# Patient Record
Sex: FEMALE | Race: WHITE | NOT HISPANIC OR LATINO | Employment: UNEMPLOYED | URBAN - NONMETROPOLITAN AREA
[De-identification: names, ages, dates, MRNs, and addresses within clinical notes are randomized per-mention and may not be internally consistent; named-entity substitution may affect disease eponyms.]

---

## 2020-07-11 ENCOUNTER — HOSPITAL ENCOUNTER (EMERGENCY)
Facility: HOSPITAL | Age: 35
Discharge: HOME | End: 2020-07-11
Attending: EMERGENCY MEDICINE | Admitting: EMERGENCY MEDICINE
Payer: COMMERCIAL

## 2020-07-11 VITALS
TEMPERATURE: 97.9 F | SYSTOLIC BLOOD PRESSURE: 142 MMHG | OXYGEN SATURATION: 97 % | HEART RATE: 95 BPM | WEIGHT: 105 LBS | DIASTOLIC BLOOD PRESSURE: 90 MMHG | RESPIRATION RATE: 18 BRPM

## 2020-07-11 DIAGNOSIS — S61.411A LACERATION OF RIGHT HAND, INITIAL ENCOUNTER: Primary | ICD-10-CM

## 2020-07-11 PROCEDURE — 99282 EMERGENCY DEPT VISIT SF MDM: CPT

## 2020-07-11 PROCEDURE — 12001 RPR S/N/AX/GEN/TRNK 2.5CM/<: CPT | Performed by: EMERGENCY MEDICINE

## 2020-07-11 PROCEDURE — 99284 EMERGENCY DEPT VISIT MOD MDM: CPT | Performed by: EMERGENCY MEDICINE

## 2020-07-11 RX ORDER — BACITRACIN, NEOMYCIN, POLYMYXIN B 400; 3.5; 5 [USP'U]/G; MG/G; [USP'U]/G
1 OINTMENT TOPICAL ONCE
Status: COMPLETED | OUTPATIENT
Start: 2020-07-11 | End: 2020-07-11

## 2020-07-11 RX ADMIN — BACITRACIN, NEOMYCIN, POLYMYXIN B 1 SMALL APPLICATION: 400; 3.5; 5 OINTMENT TOPICAL at 03:31

## 2020-07-11 NOTE — ED PROVIDER NOTES
History  Chief Complaint   Patient presents with    Hand Laceration     Pt presents to ED via walk in with laceration on the right thumb  Bleeding controlled by bandage  Patient is a 53-year-old female presents the emergency department for laceration on the right hand sustained from a razor blade which she was using to cut a plastic box  Razor blade was new and clean tetanus is up-to-date no other injury  History provided by:  Patient  Head Laceration   Location:  Hand  Hand laceration location:  R hand  Length:  2  Depth: Through underlying tissue  Quality: straight    Bleeding: controlled    Time since incident:  1 hour  Laceration mechanism:  Knife  Foreign body present:  No foreign bodies  Tetanus status:  Up to date  Associated symptoms: no fever and no rash        None       Past Medical History:   Diagnosis Date    Foot deformity, bilateral        Past Surgical History:   Procedure Laterality Date    FOOT SURGERY      b/l       History reviewed  No pertinent family history  I have reviewed and agree with the history as documented  E-Cigarette/Vaping    E-Cigarette Use Never User      E-Cigarette/Vaping Substances     Social History     Tobacco Use    Smoking status: Current Every Day Smoker     Packs/day: 0 50    Smokeless tobacco: Never Used   Substance Use Topics    Alcohol use: Not Currently    Drug use: Yes     Frequency: 2 0 times per week     Types: Methamphetamines       Review of Systems   Constitutional: Negative for activity change, appetite change, chills, fatigue and fever  HENT: Negative for congestion, ear pain, rhinorrhea and sore throat  Eyes: Negative for discharge, redness and visual disturbance  Respiratory: Negative for cough, chest tightness, shortness of breath and wheezing  Cardiovascular: Negative for chest pain and palpitations  Gastrointestinal: Negative for abdominal pain, constipation, diarrhea, nausea and vomiting     Endocrine: Negative for polydipsia and polyuria  Genitourinary: Negative for difficulty urinating, dysuria, frequency, hematuria and urgency  Musculoskeletal: Negative for arthralgias and myalgias  Skin: Positive for wound  Negative for color change, pallor and rash  Neurological: Negative for dizziness, weakness, light-headedness, numbness and headaches  Hematological: Negative for adenopathy  Does not bruise/bleed easily  All other systems reviewed and are negative  Physical Exam  Physical Exam   Constitutional: She is oriented to person, place, and time  She appears well-developed and well-nourished  HENT:   Head: Normocephalic and atraumatic  Right Ear: External ear normal    Left Ear: External ear normal    Nose: Nose normal    Mouth/Throat: Oropharynx is clear and moist    Eyes: Pupils are equal, round, and reactive to light  Conjunctivae and EOM are normal    Neck: Normal range of motion  Neck supple  Cardiovascular: Normal rate, regular rhythm, normal heart sounds and intact distal pulses  Pulmonary/Chest: Effort normal and breath sounds normal  No respiratory distress  She has no wheezes  She has no rales  She exhibits no tenderness  Abdominal: Soft  Bowel sounds are normal  She exhibits no distension  There is no tenderness  There is no guarding  Musculoskeletal: Normal range of motion  Right hand: She exhibits tenderness and laceration  Normal sensation noted  Normal strength noted  Hands:  Neurological: She is alert and oriented to person, place, and time  No cranial nerve deficit or sensory deficit  Skin: Skin is warm and dry  Psychiatric: She has a normal mood and affect  Nursing note and vitals reviewed        Vital Signs  ED Triage Vitals   Temperature Pulse Respirations Blood Pressure SpO2   07/11/20 0323 07/11/20 0318 07/11/20 0318 07/11/20 0318 07/11/20 0318   97 9 °F (36 6 °C) 95 18 142/90 97 %      Temp Source Heart Rate Source Patient Position - Orthostatic VS BP Location FiO2 (%)   07/11/20 0323 07/11/20 0318 07/11/20 0318 07/11/20 0318 --   Temporal Monitor Sitting Right arm       Pain Score       --                  Vitals:    07/11/20 0318   BP: 142/90   Pulse: 95   Patient Position - Orthostatic VS: Sitting         Visual Acuity      ED Medications  Medications   neomycin-bacitracin-polymyxin b (NEOSPORIN) ointment 1 small application (1 small application Topical Given 7/11/20 0331)       Diagnostic Studies  Results Reviewed     None                 No orders to display              Procedures  Laceration repair  Date/Time: 7/11/2020 3:31 AM  Performed by: Juancho Stanley DO  Authorized by: Juancho Stanley DO   Consent: Verbal consent obtained  Risks and benefits: risks, benefits and alternatives were discussed  Consent given by: patient  Patient understanding: patient states understanding of the procedure being performed  Patient identity confirmed: verbally with patient  Body area: upper extremity  Location details: right hand  Laceration length: 2 cm  Foreign bodies: no foreign bodies  Tendon involvement: none  Nerve involvement: none  Vascular damage: no  Anesthesia: local infiltration    Anesthesia:  Local Anesthetic: lidocaine 1% with epinephrine  Anesthetic total: 5 mL      Procedure Details:  Preparation: Patient was prepped and draped in the usual sterile fashion  Irrigation solution: tap water  Amount of cleaning: standard  Skin closure: 4-0 nylon  Number of sutures: 2  Technique: simple  Approximation: close  Approximation difficulty: simple  Dressing: 4x4 sterile gauze and antibiotic ointment  Patient tolerance: Patient tolerated the procedure well with no immediate complications               ED Course       US AUDIT      Most Recent Value   Initial Alcohol Screen: US AUDIT-C    1  How often do you have a drink containing alcohol?  0 Filed at: 07/11/2020 0325   2  How many drinks containing alcohol do you have on a typical day you are drinking?    0 Filed at: 07/11/2020 0325   3a  Male UNDER 65: How often do you have five or more drinks on one occasion? 0 Filed at: 07/11/2020 0325   3b  FEMALE Any Age, or MALE 65+: How often do you have 4 or more drinks on one occassion? 0 Filed at: 07/11/2020 0325   Audit-C Score  0 Filed at: 07/11/2020 0325                  ABILIO/DAST-10      Most Recent Value   How many times in the past year have you    Used an illegal drug or used a prescription medication for non-medical reasons? Weekly Filed at: 07/11/2020 0323   In the past 12 months      1  Have you used drugs other than those required for medical reasons? 1 Filed at: 07/11/2020 0323   2  Do you use more than one drug at a time? 0 Filed at: 07/11/2020 0323   3  Have you had medical problems as a result of your drug use (e g , memory loss, hepatitis, convulsions, bleeding, etc )? 0 Filed at: 07/11/2020 0323   4  Have you had "blackouts" or "flashbacks" as a result of drug use? YesNo  0 Filed at: 07/11/2020 0323   5  Do you ever feel bad or guilty about your drug use? 1 Filed at: 07/11/2020 0323   6  Does your spouse (or parent) ever complain about your involvement with drugs? 0 Filed at: 07/11/2020 0323   7  Have you neglected your family because of your use of drugs? 0 Filed at: 07/11/2020 0323   8  Have you engaged in illegal activities in order to obtain drugs? 0 Filed at: 07/11/2020 0323   9  Have you ever experienced withdrawal symptoms (felt sick) when you stopped taking drugs? 0 Filed at: 07/11/2020 0323   10   Are you always able to stop using drugs when you want to?  0 Filed at: 07/11/2020 0323   DAST-10 Score  2 Filed at: 07/11/2020 2223                                MDM  Number of Diagnoses or Management Options  Laceration of right hand, initial encounter: new and does not require workup  Diagnosis management comments: Laceration repair with sutures in the emergency department patient tolerated well with good cosmetic result and controlled bleeding advised local wound care follow-up in about 7-10 days for wound check and suture removal return precautions and anticipatory guidance discussed  Amount and/or Complexity of Data Reviewed  Decide to obtain previous medical records or to obtain history from someone other than the patient: yes  Review and summarize past medical records: yes    Risk of Complications, Morbidity, and/or Mortality  Presenting problems: low  Management options: low    Patient Progress  Patient progress: stable        Disposition  Final diagnoses:   Laceration of right hand, initial encounter     Time reflects when diagnosis was documented in both MDM as applicable and the Disposition within this note     Time User Action Codes Description Comment    7/11/2020  3:28 AM Brenton Pereira [[id number]] Laceration of right hand, initial encounter       ED Disposition     ED Disposition Condition Date/Time Comment    Discharge Stable Sat Jul 11, 2020  3:28 AM Pepe Sal discharge to home/self care  Follow-up Information     Follow up With Specialties Details Why Contact Info      Go in 8 weeks For suture removal, For wound re-check Your primary care physician urgent care or emergency department          Patient's Medications    No medications on file     No discharge procedures on file      PDMP Review     None          ED Provider  Electronically Signed by           Daniel Mejía DO  07/11/20 7314